# Patient Record
(demographics unavailable — no encounter records)

---

## 2024-10-07 NOTE — HISTORY OF PRESENT ILLNESS
[None] : The patient complains of no symptoms [de-identified] : Here for PPM check last month echo showed decrease in LV function and mod MR, recommended to start  GDMT and do CTA to r/o ischemic cause.  Patient reports she feels very well and does not want to change any medications at this time. He looked at Entresto cost and it was expensive.  Patient and  want to wait until they return from a trip before pursuing any changes.  They will be amenable to CTA after that.  They are aware to seek medical attention for CP/SOB or any significant issues.

## 2024-10-07 NOTE — PROCEDURE
[Complete Heart Block] : complete heart block [Pacemaker] : pacemaker [DDD] : DDD [None] : none [de-identified] : [employer] [de-identified] : Lexis ROBLES [de-identified] : 3/6/2020 [de-identified] : 50/130 [de-identified] : 65% (6 yr 9 mo) [de-identified] : normal device function no events

## 2025-01-21 NOTE — PHYSICAL EXAM
[Well Developed] : well developed [Well Nourished] : well nourished [No Acute Distress] : no acute distress [Normal Venous Pressure] : normal venous pressure [Normal S1, S2] : normal S1, S2 [No Murmur] : no murmur [No Rub] : no rub [Clear Lung Fields] : clear lung fields [No Respiratory Distress] : no respiratory distress  [Soft] : abdomen soft [Non Tender] : non-tender [No Masses/organomegaly] : no masses/organomegaly [Normal Gait] : normal gait [No Edema] : no edema [No Cyanosis] : no cyanosis [No Rash] : no rash [Moves all extremities] : moves all extremities [Alert and Oriented] : alert and oriented

## 2025-01-21 NOTE — CARDIOLOGY SUMMARY
[de-identified] : 12/26/24: 3vCAD, LAD: 80% calcified proximal/mid stenosis, LCx: 80% calcified mid stenosis, RCA: 100% mid stenosis , fills via left to right collaterals. LVEF: 25-30%, EDp 15 mmHg, mild MR, Access: Right radial.   1/10/25 INTERVENTION SUMMARY:   1. Rotational Atherectomy (complicated by stalled rex entrapment needing removal) followed by successful intervention of proximal LAD with Xience CORINNA (3.5 x 15)( 3 x 48 mm) overlapped (Excellent angiographic result with 0% residual stenosis and MICHAEL 3 flow.   2. IVUS was performed to guide intervention. Post MSA > 5.8 mm2.  3. Impella CP was removed at the end of the procedure

## 2025-01-21 NOTE — ASSESSMENT
[FreeTextEntry1] : ICM, Stage C, NYHA 2, EF 35-40, TRACIE 45  BB: Toprol 25 RAASi: losartan 25 MRA: start Ald 25 SGLT2i: start Farxiga 10 Device: s/p dual-chamber PPM 2015 (2007 justine, abandoned lead) HF workup: echo in 1 month, labs in 2 week CAD: ASA, Plavix, increase Lipitor to 40, f/u Dr. Johnson for LCx +/- RCA revasc  RTC: first week of March

## 2025-03-27 NOTE — HISTORY OF PRESENT ILLNESS
[FreeTextEntry1] : Had PCI of RCA , excellent angiographic result, no complications Reports is active: walks daily, would like to get back to [employer] to do cardio exercises Declines cardiac rehab d/t language barrier and prefers home exercises No angina or HF symptoms

## 2025-03-27 NOTE — REASON FOR VISIT
[Cardiac Failure] : cardiac failure [Structural Heart and Valve Disease] : structural heart and valve disease [FreeTextEntry1] : 84F Stage C ICM, diagnosed 10/2024 after presented to office with HF symptoms NYHA 2 symptoms EF 35%, TRACIE 45

## 2025-03-27 NOTE — CARDIOLOGY SUMMARY
[de-identified] : SR, LBBB, QRSd 157 [de-identified] : 3/7/25: successful intervention of dRCA and pRCA . Antigrade and retrograde approach. IVUS post >7 mm2 and MICHAEL 3 flow.  12/26/24: 3vCAD, LAD: 80% calcified proximal/mid stenosis, LCx: 80% calcified mid stenosis, RCA: 100% mid stenosis , fills via left to right collaterals. LVEF: 25-30%, EDp 15 mmHg, mild MR, Access: Right radial.   1/10/25 INTERVENTION SUMMARY:   1. Rotational Atherectomy (complicated by stalled rex entrapment needing removal) followed by successful intervention of proximal LAD with Xience CORINNA (3.5 x 15)( 3 x 48 mm) overlapped (Excellent angiographic result with 0% residual stenosis and MICHAEL 3 flow.   2. IVUS was performed to guide intervention. Post MSA > 5.8 mm2.  3. Impella CP was removed at the end of the procedure   Statement Selected

## 2025-03-27 NOTE — ASSESSMENT
[FreeTextEntry1] : ICM, Stage C, NYHA 2, EF 35-40, TRACIE 45  BB: Toprol 25 RAASi: losartan 25, will switch to Entresto if BMP allows MRA: Ald 25 SGLT2i: Farxiga 10 Device: s/p dual-chamber PPM 2015 (2007 justine, abandoned lead) HF workup: echo in 3 months, labs today CAD: ASA, Plavix, Lipitor 40, f/u Dr. Johnson Cardiac Rehab: pt declines  RTC: 3mo

## 2025-05-05 NOTE — PROCEDURE
[Pacemaker] : pacemaker [[employer]] : [employer] [DDD] : DDD [Threshold Testing Performed] : Threshold testing was performed [None] : none [Counters Reset] : the counters were reset [de-identified] : Lexis 8 MARGARET  [de-identified] : [id number] [de-identified] : 3/16/2020 [de-identified] : 50/130 [de-identified] : 6 years 2 months [de-identified] : Normal device function. 10 ATR mode switches with 3 atrial episodes longest 28 seconds

## 2025-05-05 NOTE — DISCUSSION/SUMMARY
[Pacemaker Function Normal] : normal pacemaker function [Patient] : the patient [de-identified] : Continue remote monitoring. Return in 6 months for device interrogation [de-identified] : and spouse

## 2025-05-05 NOTE — PROCEDURE
[Pacemaker] : pacemaker [[employer]] : [employer] [DDD] : DDD [Threshold Testing Performed] : Threshold testing was performed [None] : none [Counters Reset] : the counters were reset [de-identified] : Lexis 8 MARGARET  [de-identified] : [id number] [de-identified] : 3/16/2020 [de-identified] : 50/130 [de-identified] : 6 years 2 months [de-identified] : Normal device function. 10 ATR mode switches with 3 atrial episodes longest 28 seconds

## 2025-05-05 NOTE — HISTORY OF PRESENT ILLNESS
[Palpitations] : no palpitations [SOB] : no dyspnea [Syncope] : no syncope [Dizziness] : no dizziness [Chest Pain] : no chest pain or discomfort [de-identified] : 84 year old female with history of complete heart block sp PPM placement 2007 and s/p battery change on 3/16/2020. 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 Found to have LV dysfunction and MR.

## 2025-05-05 NOTE — DISCUSSION/SUMMARY
[Pacemaker Function Normal] : normal pacemaker function [Patient] : the patient [de-identified] : Continue remote monitoring. Return in 6 months for device interrogation [de-identified] : and spouse

## 2025-05-05 NOTE — REVIEW OF SYSTEMS
[id number] [SOB] : no shortness of breath [Dyspnea on exertion] : not dyspnea during exertion [Chest Discomfort] : no chest discomfort [Palpitations] : no palpitations [Syncope] : no syncope

## 2025-05-05 NOTE — HISTORY OF PRESENT ILLNESS
[Palpitations] : no palpitations [SOB] : no dyspnea [Syncope] : no syncope [Dizziness] : no dizziness [Chest Pain] : no chest pain or discomfort [de-identified] : 84 year old female with history of complete heart block sp PPM placement 2007 and s/p battery change on 3/16/2020. 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 Found to have LV dysfunction and MR.